# Patient Record
Sex: FEMALE | Employment: OTHER | URBAN - METROPOLITAN AREA
[De-identification: names, ages, dates, MRNs, and addresses within clinical notes are randomized per-mention and may not be internally consistent; named-entity substitution may affect disease eponyms.]

---

## 2022-11-16 ENCOUNTER — TELEPHONE (OUTPATIENT)
Dept: FAMILY MEDICINE CLINIC | Age: 72
End: 2022-11-16

## 2022-11-18 ENCOUNTER — OFFICE VISIT (OUTPATIENT)
Dept: FAMILY MEDICINE CLINIC | Age: 72
End: 2022-11-18
Payer: MEDICARE

## 2022-11-18 VITALS
HEIGHT: 59 IN | SYSTOLIC BLOOD PRESSURE: 136 MMHG | WEIGHT: 111.8 LBS | BODY MASS INDEX: 22.54 KG/M2 | OXYGEN SATURATION: 97 % | HEART RATE: 79 BPM | RESPIRATION RATE: 16 BRPM | DIASTOLIC BLOOD PRESSURE: 87 MMHG | TEMPERATURE: 97.8 F

## 2022-11-18 DIAGNOSIS — M25.552 BILATERAL HIP PAIN: Primary | ICD-10-CM

## 2022-11-18 DIAGNOSIS — M54.41 CHRONIC BILATERAL LOW BACK PAIN WITH RIGHT-SIDED SCIATICA: ICD-10-CM

## 2022-11-18 DIAGNOSIS — K64.9 HEMORRHOIDS, UNSPECIFIED HEMORRHOID TYPE: ICD-10-CM

## 2022-11-18 DIAGNOSIS — M79.18 BILATERAL BUTTOCK PAIN: ICD-10-CM

## 2022-11-18 DIAGNOSIS — M25.551 BILATERAL HIP PAIN: Primary | ICD-10-CM

## 2022-11-18 DIAGNOSIS — G89.29 CHRONIC BILATERAL LOW BACK PAIN WITH RIGHT-SIDED SCIATICA: ICD-10-CM

## 2022-11-18 DIAGNOSIS — C34.90 MALIGNANT NEOPLASM OF LUNG, UNSPECIFIED LATERALITY, UNSPECIFIED PART OF LUNG (HCC): ICD-10-CM

## 2022-11-18 DIAGNOSIS — R26.9 GAIT ABNORMALITY: ICD-10-CM

## 2022-11-18 PROCEDURE — G8510 SCR DEP NEG, NO PLAN REQD: HCPCS | Performed by: FAMILY MEDICINE

## 2022-11-18 PROCEDURE — G8420 CALC BMI NORM PARAMETERS: HCPCS | Performed by: FAMILY MEDICINE

## 2022-11-18 PROCEDURE — G8427 DOCREV CUR MEDS BY ELIG CLIN: HCPCS | Performed by: FAMILY MEDICINE

## 2022-11-18 PROCEDURE — G8536 NO DOC ELDER MAL SCRN: HCPCS | Performed by: FAMILY MEDICINE

## 2022-11-18 PROCEDURE — 99204 OFFICE O/P NEW MOD 45 MIN: CPT | Performed by: FAMILY MEDICINE

## 2022-11-18 PROCEDURE — G8400 PT W/DXA NO RESULTS DOC: HCPCS | Performed by: FAMILY MEDICINE

## 2022-11-18 PROCEDURE — 1123F ACP DISCUSS/DSCN MKR DOCD: CPT | Performed by: FAMILY MEDICINE

## 2022-11-18 PROCEDURE — G0463 HOSPITAL OUTPT CLINIC VISIT: HCPCS | Performed by: FAMILY MEDICINE

## 2022-11-18 PROCEDURE — 1101F PT FALLS ASSESS-DOCD LE1/YR: CPT | Performed by: FAMILY MEDICINE

## 2022-11-18 PROCEDURE — 3017F COLORECTAL CA SCREEN DOC REV: CPT | Performed by: FAMILY MEDICINE

## 2022-11-18 PROCEDURE — 1090F PRES/ABSN URINE INCON ASSESS: CPT | Performed by: FAMILY MEDICINE

## 2022-11-18 RX ORDER — OCTREOTIDE ACETATE 50 UG/ML
50 INJECTION, SOLUTION INTRAVENOUS; SUBCUTANEOUS
COMMUNITY

## 2022-11-18 RX ORDER — EVEROLIMUS 5 MG/1
5 TABLET ORAL 2 TIMES DAILY
COMMUNITY
Start: 2022-10-31

## 2022-11-18 RX ORDER — TIZANIDINE 4 MG/1
4 TABLET ORAL
Qty: 30 TABLET | Refills: 2 | Status: SHIPPED | OUTPATIENT
Start: 2022-11-18

## 2022-11-18 RX ORDER — GABAPENTIN 300 MG/1
300 CAPSULE ORAL
Qty: 30 CAPSULE | Refills: 2 | Status: SHIPPED | OUTPATIENT
Start: 2022-11-18

## 2022-11-18 RX ORDER — PHOSPHORATED CARBOHYDRATE 1.87; 21.5; 1.87 G/5ML; MG/5ML; G/5ML
5 SOLUTION ORAL AS NEEDED
COMMUNITY

## 2022-11-18 RX ORDER — LORATADINE 10 MG/1
TABLET ORAL AS NEEDED
COMMUNITY
Start: 2022-06-03

## 2022-11-18 RX ORDER — FLUTICASONE PROPIONATE 50 MCG
SPRAY, SUSPENSION (ML) NASAL AS NEEDED
COMMUNITY
Start: 2022-06-03

## 2022-11-18 RX ORDER — DESVENLAFAXINE SUCCINATE 50 MG/1
TABLET, EXTENDED RELEASE ORAL
COMMUNITY
Start: 2022-09-16 | End: 2022-11-18

## 2022-11-18 RX ORDER — EVEROLIMUS 10 MG/1
10 TABLET ORAL DAILY
COMMUNITY
Start: 2022-06-03 | End: 2022-11-18

## 2022-11-18 NOTE — PROGRESS NOTES
LANIE Ward  is a 81 y.o. female who presents as a new patient. Some record is available in Care Everywhere. Reviewed prior to visit. She appears to have a history of stage IV lung cancer followed by [facility] oncology detailed below. Moved from [place] to [place] not too long ago. Speaks [place]. Initial portion of the visit conducted using  (green tablet) but patient and daughter expressed a preference to have daughter translate and so we transition to that. Complains of low back/buttock pain that goes down her legs. Right is worse than left. Brought this up with her oncologist in September and had a trial off of her cancer medication to see if that was a side effect. It was not a side effect. She feels that has gotten progressively worse since then. It is most problematic at night. She points to the sacral area bilaterally when describing the pain. Points to the middle of the buttock and then motions down the back of her leg behind the knee down to the calf. Stops at the ankle and does not involve the feet. Always present. Worse with standing to the point where she cannot stand at counter and prepare a meal.  Difficult to walk. Is painful getting into the bathroom. Painful to sleep particularly with rolling over in bed. Record reviewed in Care Everywhere: Follows with [facility] oncology for neuroendocrine carcinoid tumor of bilateral lungs diagnosed 2018 most recently seen September 2022. Report at this most recent visit suggest stability of her lung nodules. Appears to have a percutaneous cholecystectomy tube after found to have cholecystitis in May 2022. Rather than a surgical intervention had IR drainage and this tube is been in place since June 1, 2022. Seems that the tube gets capped and if she has a recurrence of her right upper quadrant pain and then a bag will get attached to it.   Has to do this about every 10 days    Complaint of cough and shortness of breath with physical activity that is not compatible with the amount of lung involvement (CT did mention \"mild diffuse air trapping with interval improvement from prior\") also notes mild emphysema    Atherosclerotic CAD noted on CT scan so was suggested she proceed with a stress test to work-up the shortness of breath. Stress test appears to be normal      Labs last done 2022  GFR 96  Creatinine 0.6  Glucose 128  WBC 4.8  Hemoglobin 12.3  Platelet 965    Stress test results 2022 \"no quantifiable perfusion defect\" LV ejection fraction greater than 90%      PMHx:  Past Medical History:   Diagnosis Date    [facility]        Meds:       Allergies: Allergies   Allergen Reactions    Other Plant, Animal, Environmental Anxiety, Cough, Other (comments), Palpitations and Shortness of Breath    Mold Anxiety, Cough and Shortness of Breath       Smoker:  Social History     Tobacco Use   Smoking Status Former    Types: Cigarettes    Quit date:     Years since quittin.9   Smokeless Tobacco Never       ETOH:   Social History     Substance and Sexual Activity   Alcohol Use Not Currently       FH: History reviewed. No pertinent family history. ROS:   As listed in HPI. In addition:  Constitutional:   No headache, fever, fatigue, weight loss or weight gain      Cardiac:    No chest pain      Resp:   No cough or shortness of breath      Neuro   No loss of consciousness, dizziness, seizures      Physical Exam:  Blood pressure 136/87, pulse 79, temperature 97.8 °F (36.6 °C), temperature source Temporal, resp. rate 16, height 4' 11\" (1.499 m), weight 111 lb 12.8 oz (50.7 kg), SpO2 97 %. GEN: No apparent distress. Alert and oriented and responds to all questions appropriately. NEUROLOGIC:  No focal neurologic deficits. Strength and sensation grossly intact. Coordination and gait grossly intact. EXT: Well perfused. No edema. SKIN: No obvious rashes. Low back examined.   There is no midline bony tenderness. There is no paraspinal, erector spinate quadratus lumborum tenderness. There are tender points in the gluteus linda at its insertion at the SI. There is pain to the piriformis on palpation. Figure 4 on the right reveals a limited internal rotation to about 50 degrees and pain on stretch of the gluteus and piriformis. Left leg is similar although range of motion is closer to 80 degrees  internal rotation         Assessment and Plan     CAD  Plaques noted on imaging. Recent stress test with no perfusion defect    Emphysema  Evident on CT    Stage IV lung cancer  neuroendocrine carcinoid tumor of bilateral lungs diagnosed 2018   Care of [facility] oncology    Complains of hemorrhoidal bleeding. Apparently this has been significant enough that she has bled through her clothing and needed to change while out of the house  Would like to consult with a specialist.  Refer to colon and rectal.    Low back pain, bilateral buttock pain, radiculopathy down both legs but right worse than the left  Worsening over the last few months in September  My impression is this is primarily muscular. Possible SI joint, possible hip joint. Given the history of cancer imaging is prudent  Has had recent images of her spine suggesting no osseous lesions which is reassuring  I would like to check bilateral hip x-rays  Physical therapy would be appropriate. However she cannot leave the home without the assistance of another. Requires home health physical therapy due to being homebound. Try tizanidine and gabapentin for the radicular pains.   Sounds like she only needs medicine at night so we will start with that    Mobility problem  She cannot comfortably move from one room to another within the home due to a combination of shortness of breath and the back/leg pain mentioned above  Cane and walker are not sufficient to help move from 1 room to the other  Manual wheelchair is not sufficient because she cannot self propel due to SOB/ODELL  Requires a powered mobility device to move within the home and perform ADLs    Is already been in contact with ning Fink  fax [phone]    This was a 45-minute visit. Greater than 50% of the time was spent counseling the patient on back pain/hip pain. ICD-10-CM ICD-9-CM    1. Bilateral hip pain  M25.551 719.45 XR HIP RT W OR WO PELV 2-3 VWS    M25.552  XR HIP LT W OR WO PELV 2-3 VWS      OTHER      REFERRAL TO HOME HEALTH      2. Bilateral buttock pain  M79.18 729.1 XR HIP RT W OR WO PELV 2-3 VWS      XR HIP LT W OR WO PELV 2-3 VWS      OTHER      REFERRAL TO HOME HEALTH      3. Chronic bilateral low back pain with right-sided sciatica  M54.41 724.2 OTHER    G89.29 724.3 REFERRAL TO HOME HEALTH     338.29 tiZANidine (ZANAFLEX) 4 mg tablet      gabapentin (NEURONTIN) 300 mg capsule      4. Hemorrhoids, unspecified hemorrhoid type  K64.9 455.6 REFERRAL TO COLON AND RECTAL SURGERY      5. Gait abnormality  R26.9 781.2 OTHER      REFERRAL TO HOME HEALTH      6. Malignant neoplasm of lung, unspecified laterality, unspecified part of lung ([facility]  C34.90 162.9           AVS given.  Pt expressed understanding of instructions

## 2022-11-18 NOTE — PROGRESS NOTES
Health Maintenance Due   Topic Date Due    Hepatitis C Screening  Never done    Depression Screen  Never done    DTaP/Tdap/Td series (1 - Tdap) Never done    Lipid Screen  Never done    Colorectal Cancer Screening Combo  Never done    Shingrix Vaccine Age 50> (1 of 2) Never done    Breast Cancer Screen Mammogram  Never done    Bone Densitometry (Dexa) Screening  Never done    Pneumococcal 65+ years (3) 06/24/2021    Flu Vaccine (1) 08/01/2022     1. \"Have you been to the ER, urgent care clinic since your last visit? Hospitalized since your last visit? \" No    2. \"Have you seen or consulted any other health care providers outside of the [address] [place] [address] since your last visit? \" No     3. For patients aged 39-70: Has the patient had a colonoscopy / FIT/ Cologuard? Yes - Care Gap present. Rooming MA/LPN to request most recent results yes. Dr. Clotilde Apley Pietro Hayward, MD) [phone]      If the patient is female:    4. For patients aged 41-77: Has the patient had a mammogram within the past 2 years? Yes - Care Gap present. Most recent result on file  yes. [facility] [phone] esther wallis md      5. For patients aged 21-65: Has the patient had a pap smear? NA - based on age or sex    Do you have an [address] in place in the event that you have a healthcare crisis that could impact your decision making as it pertains to your health? YES    Would you like information about Advance Care Planning? NO    Information given.  NO

## 2022-11-21 ENCOUNTER — HOSPITAL ENCOUNTER (OUTPATIENT)
Dept: GENERAL RADIOLOGY | Age: 72
Discharge: HOME | End: 2022-11-21
Payer: MEDICARE

## 2022-11-21 DIAGNOSIS — M25.551 BILATERAL HIP PAIN: ICD-10-CM

## 2022-11-21 DIAGNOSIS — M25.552 BILATERAL HIP PAIN: ICD-10-CM

## 2022-11-21 DIAGNOSIS — M79.18 BILATERAL BUTTOCK PAIN: ICD-10-CM

## 2022-11-21 PROCEDURE — 73502 X-RAY EXAM HIP UNI 2-3 VIEWS: CPT

## 2022-11-22 ENCOUNTER — TELEPHONE (OUTPATIENT)
Dept: FAMILY MEDICINE CLINIC | Age: 72
End: 2022-11-22

## 2022-11-22 ENCOUNTER — DOCUMENTATION ONLY (OUTPATIENT)
Dept: FAMILY MEDICINE CLINIC | Age: 72
End: 2022-11-22

## 2022-11-22 NOTE — PROGRESS NOTES
** Late Entry 11.21.2022 **    DME order, demographics, and office note faxed to [facility]inder with attn to [facility] VICTOR HUGO FORRESTER @ [phone] and confirmation page recv'd. Referral, demographics, and office note faxed to [address] and confirmation page recv'd.

## 2022-11-22 NOTE — TELEPHONE ENCOUNTER
Hip x-rays reviewed.   The x-ray does show mild arthritis in the hips but her hips are not causing her any pain based on our exam.  Should expect improvement with physical therapy as we discussed    This is a reassuring x-ray

## 2023-01-03 ENCOUNTER — DOCUMENTATION ONLY (OUTPATIENT)
Dept: FAMILY MEDICINE CLINIC | Age: 73
End: 2023-01-03

## 2023-06-06 ENCOUNTER — HOSPITAL ENCOUNTER (OUTPATIENT)
Facility: HOSPITAL | Age: 73
Discharge: HOME | End: 2023-06-09
Attending: ORTHOPAEDIC SURGERY
Payer: MEDICARE

## 2023-06-06 DIAGNOSIS — M47.816 LUMBAR SPONDYLOSIS: ICD-10-CM

## 2023-06-06 DIAGNOSIS — M54.16 RADICULOPATHY OF LUMBAR REGION: ICD-10-CM

## 2023-06-06 DIAGNOSIS — M54.31 RIGHT SIDED SCIATICA: ICD-10-CM

## 2023-06-06 DIAGNOSIS — M51.36 DDD (DEGENERATIVE DISC DISEASE), LUMBAR: ICD-10-CM

## 2023-06-06 DIAGNOSIS — M54.50 LUMBAR PAIN: ICD-10-CM

## 2023-06-06 PROCEDURE — 72148 MRI LUMBAR SPINE W/O DYE: CPT

## 2023-11-06 ENCOUNTER — HOSPITAL ENCOUNTER (OUTPATIENT)
Facility: HOSPITAL | Age: 73
Discharge: HOME | End: 2023-11-09
Payer: MEDICARE

## 2023-11-06 VITALS — BODY MASS INDEX: 22.53 KG/M2 | HEIGHT: 59 IN | WEIGHT: 111.77 LBS

## 2023-11-06 DIAGNOSIS — Z12.31 VISIT FOR SCREENING MAMMOGRAM: ICD-10-CM

## 2023-11-06 PROCEDURE — 77063 BREAST TOMOSYNTHESIS BI: CPT

## 2024-02-25 SDOH — ECONOMIC STABILITY: FOOD INSECURITY: WITHIN THE PAST 12 MONTHS, THE FOOD YOU BOUGHT JUST DIDN'T LAST AND YOU DIDN'T HAVE MONEY TO GET MORE.: NEVER TRUE

## 2024-02-25 SDOH — HEALTH STABILITY: PHYSICAL HEALTH: ON AVERAGE, HOW MANY DAYS PER WEEK DO YOU ENGAGE IN MODERATE TO STRENUOUS EXERCISE (LIKE A BRISK WALK)?: 0 DAYS

## 2024-02-25 SDOH — HEALTH STABILITY: PHYSICAL HEALTH: ON AVERAGE, HOW MANY MINUTES DO YOU ENGAGE IN EXERCISE AT THIS LEVEL?: 0 MIN

## 2024-02-25 SDOH — ECONOMIC STABILITY: FOOD INSECURITY: WITHIN THE PAST 12 MONTHS, YOU WORRIED THAT YOUR FOOD WOULD RUN OUT BEFORE YOU GOT MONEY TO BUY MORE.: NEVER TRUE

## 2024-02-25 SDOH — ECONOMIC STABILITY: INCOME INSECURITY: HOW HARD IS IT FOR YOU TO PAY FOR THE VERY BASICS LIKE FOOD, HOUSING, MEDICAL CARE, AND HEATING?: SOMEWHAT HARD

## 2024-02-25 SDOH — ECONOMIC STABILITY: HOUSING INSECURITY
IN THE LAST 12 MONTHS, WAS THERE A TIME WHEN YOU DID NOT HAVE A STEADY PLACE TO SLEEP OR SLEPT IN A SHELTER (INCLUDING NOW)?: NO

## 2024-02-25 SDOH — ECONOMIC STABILITY: TRANSPORTATION INSECURITY
IN THE PAST 12 MONTHS, HAS LACK OF TRANSPORTATION KEPT YOU FROM MEETINGS, WORK, OR FROM GETTING THINGS NEEDED FOR DAILY LIVING?: NO

## 2024-02-25 ASSESSMENT — LIFESTYLE VARIABLES
HOW OFTEN DO YOU HAVE SIX OR MORE DRINKS ON ONE OCCASION: 1
HOW OFTEN DO YOU HAVE A DRINK CONTAINING ALCOHOL: 1
HOW MANY STANDARD DRINKS CONTAINING ALCOHOL DO YOU HAVE ON A TYPICAL DAY: PATIENT DOES NOT DRINK
HOW OFTEN DO YOU HAVE A DRINK CONTAINING ALCOHOL: NEVER
HOW MANY STANDARD DRINKS CONTAINING ALCOHOL DO YOU HAVE ON A TYPICAL DAY: 0

## 2024-02-25 ASSESSMENT — PATIENT HEALTH QUESTIONNAIRE - PHQ9
4. FEELING TIRED OR HAVING LITTLE ENERGY: 2
7. TROUBLE CONCENTRATING ON THINGS, SUCH AS READING THE NEWSPAPER OR WATCHING TELEVISION: 1
2. FEELING DOWN, DEPRESSED OR HOPELESS: 1
8. MOVING OR SPEAKING SO SLOWLY THAT OTHER PEOPLE COULD HAVE NOTICED. OR THE OPPOSITE, BEING SO FIGETY OR RESTLESS THAT YOU HAVE BEEN MOVING AROUND A LOT MORE THAN USUAL: 0
3. TROUBLE FALLING OR STAYING ASLEEP: 3
6. FEELING BAD ABOUT YOURSELF - OR THAT YOU ARE A FAILURE OR HAVE LET YOURSELF OR YOUR FAMILY DOWN: 0
10. IF YOU CHECKED OFF ANY PROBLEMS, HOW DIFFICULT HAVE THESE PROBLEMS MADE IT FOR YOU TO DO YOUR WORK, TAKE CARE OF THINGS AT HOME, OR GET ALONG WITH OTHER PEOPLE: 1
9. THOUGHTS THAT YOU WOULD BE BETTER OFF DEAD, OR OF HURTING YOURSELF: 0
5. POOR APPETITE OR OVEREATING: 0
SUM OF ALL RESPONSES TO PHQ QUESTIONS 1-9: 9
SUM OF ALL RESPONSES TO PHQ QUESTIONS 1-9: 9
SUM OF ALL RESPONSES TO PHQ9 QUESTIONS 1 & 2: 3
SUM OF ALL RESPONSES TO PHQ QUESTIONS 1-9: 9
SUM OF ALL RESPONSES TO PHQ QUESTIONS 1-9: 9
1. LITTLE INTEREST OR PLEASURE IN DOING THINGS: 2

## 2024-02-26 ENCOUNTER — OFFICE VISIT (OUTPATIENT)
Age: 74
End: 2024-02-26
Payer: MEDICARE

## 2024-02-26 VITALS
TEMPERATURE: 97.8 F | RESPIRATION RATE: 16 BRPM | SYSTOLIC BLOOD PRESSURE: 155 MMHG | HEIGHT: 59 IN | BODY MASS INDEX: 24.96 KG/M2 | DIASTOLIC BLOOD PRESSURE: 75 MMHG | HEART RATE: 87 BPM | OXYGEN SATURATION: 96 % | WEIGHT: 123.8 LBS

## 2024-02-26 DIAGNOSIS — Z00.00 ROUTINE GENERAL MEDICAL EXAMINATION AT A HEALTH CARE FACILITY: Primary | ICD-10-CM

## 2024-02-26 DIAGNOSIS — M54.41 CHRONIC RIGHT-SIDED LOW BACK PAIN WITH RIGHT-SIDED SCIATICA: ICD-10-CM

## 2024-02-26 DIAGNOSIS — M77.01 GOLFER'S ELBOW, RIGHT: ICD-10-CM

## 2024-02-26 DIAGNOSIS — K64.9 HEMORRHOIDS, UNSPECIFIED HEMORRHOID TYPE: ICD-10-CM

## 2024-02-26 DIAGNOSIS — Z23 ENCOUNTER FOR IMMUNIZATION: ICD-10-CM

## 2024-02-26 DIAGNOSIS — G89.29 CHRONIC RIGHT-SIDED LOW BACK PAIN WITH RIGHT-SIDED SCIATICA: ICD-10-CM

## 2024-02-26 DIAGNOSIS — M79.18 MYALGIA, OTHER SITE: ICD-10-CM

## 2024-02-26 DIAGNOSIS — M67.911 DISORDER OF ROTATOR CUFF, RIGHT: ICD-10-CM

## 2024-02-26 DIAGNOSIS — M75.21 BICEPS TENDINITIS OF RIGHT SHOULDER: ICD-10-CM

## 2024-02-26 DIAGNOSIS — G89.29 OTHER CHRONIC PAIN: ICD-10-CM

## 2024-02-26 DIAGNOSIS — R26.89 BALANCE PROBLEM: ICD-10-CM

## 2024-02-26 DIAGNOSIS — B35.1 ONYCHOMYCOSIS: ICD-10-CM

## 2024-02-26 PROCEDURE — 1123F ACP DISCUSS/DSCN MKR DOCD: CPT | Performed by: FAMILY MEDICINE

## 2024-02-26 PROCEDURE — 90694 VACC AIIV4 NO PRSRV 0.5ML IM: CPT | Performed by: FAMILY MEDICINE

## 2024-02-26 PROCEDURE — 99214 OFFICE O/P EST MOD 30 MIN: CPT | Performed by: FAMILY MEDICINE

## 2024-02-26 PROCEDURE — G8484 FLU IMMUNIZE NO ADMIN: HCPCS | Performed by: FAMILY MEDICINE

## 2024-02-26 PROCEDURE — 1090F PRES/ABSN URINE INCON ASSESS: CPT | Performed by: FAMILY MEDICINE

## 2024-02-26 PROCEDURE — G8419 CALC BMI OUT NRM PARAM NOF/U: HCPCS | Performed by: FAMILY MEDICINE

## 2024-02-26 PROCEDURE — G8399 PT W/DXA RESULTS DOCUMENT: HCPCS | Performed by: FAMILY MEDICINE

## 2024-02-26 PROCEDURE — 3017F COLORECTAL CA SCREEN DOC REV: CPT | Performed by: FAMILY MEDICINE

## 2024-02-26 PROCEDURE — PBSHW INFLUENZA, FLUAD, (AGE 65 Y+), IM, PF, 0.5 ML: Performed by: FAMILY MEDICINE

## 2024-02-26 PROCEDURE — 1036F TOBACCO NON-USER: CPT | Performed by: FAMILY MEDICINE

## 2024-02-26 PROCEDURE — G8427 DOCREV CUR MEDS BY ELIG CLIN: HCPCS | Performed by: FAMILY MEDICINE

## 2024-02-26 PROCEDURE — G0439 PPPS, SUBSEQ VISIT: HCPCS | Performed by: FAMILY MEDICINE

## 2024-02-26 RX ORDER — TIZANIDINE 4 MG/1
4 TABLET ORAL NIGHTLY PRN
Qty: 30 TABLET | Refills: 3 | Status: SHIPPED | OUTPATIENT
Start: 2024-02-26

## 2024-02-26 RX ORDER — DULOXETIN HYDROCHLORIDE 20 MG/1
20 CAPSULE, DELAYED RELEASE ORAL DAILY
Qty: 30 CAPSULE | Refills: 3 | Status: SHIPPED | OUTPATIENT
Start: 2024-02-26

## 2024-02-26 SDOH — ECONOMIC STABILITY: INCOME INSECURITY: HOW HARD IS IT FOR YOU TO PAY FOR THE VERY BASICS LIKE FOOD, HOUSING, MEDICAL CARE, AND HEATING?: NOT HARD AT ALL

## 2024-02-26 SDOH — ECONOMIC STABILITY: FOOD INSECURITY: WITHIN THE PAST 12 MONTHS, YOU WORRIED THAT YOUR FOOD WOULD RUN OUT BEFORE YOU GOT MONEY TO BUY MORE.: NEVER TRUE

## 2024-02-26 SDOH — ECONOMIC STABILITY: FOOD INSECURITY: WITHIN THE PAST 12 MONTHS, THE FOOD YOU BOUGHT JUST DIDN'T LAST AND YOU DIDN'T HAVE MONEY TO GET MORE.: NEVER TRUE

## 2024-02-26 NOTE — PROGRESS NOTES
Chief Complaint   Patient presents with    Medicare AWV         Health Maintenance Due   Topic Date Due    COVID-19 Vaccine (1) Never done    Hepatitis C screen  Never done    DTaP/Tdap/Td vaccine (1 - Tdap) Never done    Lipids  Never done    Colorectal Cancer Screen  Never done    Respiratory Syncytial Virus (RSV) Pregnant or age 60 yrs+ (1 - 1-dose 60+ series) Never done    Shingles vaccine (2 of 3) 08/18/2016    Pneumococcal 65+ years Vaccine (3 - PPSV23 or PCV20) 06/26/2022    Flu vaccine (1) 08/01/2023    Annual Wellness Visit (Medicare)  Never done         \"Have you been to the ER, urgent care clinic since your last visit?  Hospitalized since your last visit?\"    NO    “Have you seen or consulted any other health care providers outside of [facility] since your last visit?”    oncology    “Have you had a colorectal cancer screening such as a colonoscopy/FIT/Cologuard?    NO

## 2024-02-26 NOTE — PROGRESS NOTES
Medicare Annual Wellness Visit    I have reviewed the patient's medical history in detail and updated the computerized patient record.     History   Boby Malone is a 74 y.o. female who presents to follow-up on chronic medical issues    Establish care with me in 2022.  This is my second time meeting her.  She speaks [place] and has expressed that she does not like the  and prefers to use her daughter.    Again complains of low back pain goes down her right leg.  I tried her on tizanidine which she liked.  Tried some home health physical therapy which was helpful but she did not do her homework.  Established with orthospine and has an injection x 2 that did not last more than a day.  Has been given gabapentin and takes 600 3 times daily without sufficient relief (she is not sure she is getting any relief) and tried amitriptyline for 2 weeks but got intolerably dizzy.    She has been offered surgery by Ortho.  She has other comorbidities that would make her high risk for surgery but her cancer doctor has said that she could stop Xarelto and that her lungs could handle procedure (patient's report)    She has been using a cane because she feels that her back pain and right leg weakness are affecting her balance.  She is using a cane in her right hand.  In the last week or so she has developed pain in the shoulder and in the hand      History:  Follows with [facility] oncology for neuroendocrine carcinoid tumor of bilateral lungs diagnosed 2018.  Following regularly.  Evidently stable     Appears to have a percutaneous cholecystectomy tube after found to have cholecystitis in May 2022.  Rather than a surgical intervention had IR drainage and this tube is been in place since June 1, 2022.  As of our visit 11/2022 seems that the tube gets capped and if she has a recurrence of her right upper quadrant pain and then a bag will get attached to it.  Has to do this about every 10 days.  Did not follow-up on

## 2024-03-04 ENCOUNTER — TELEPHONE (OUTPATIENT)
Age: 74
End: 2024-03-04

## 2024-03-04 NOTE — TELEPHONE ENCOUNTER
No strong preference but I think what she is asking is where did we send the referral.  I had ordered home health physical therapy at her visit.  I take it that she has not successfully gotten that set up yet.  Please check with daughter and resend referral as necessary

## 2024-04-04 NOTE — PROGRESS NOTES
Called [facility] x2 for patient using .     Call made to Dr. Crockett's office for orders and Xarelto instructions. Making them aware unable to get in contact with patient. Patient was told by office to hold Xarelto 3 days and TOA/NPO instructions were reviewed. TOA was 0530 4/5/24 to surgery center.

## 2024-04-05 ENCOUNTER — ANESTHESIA EVENT (OUTPATIENT)
Facility: HOSPITAL | Age: 74
End: 2024-04-05
Payer: MEDICARE

## 2024-04-05 ENCOUNTER — ANESTHESIA (OUTPATIENT)
Facility: HOSPITAL | Age: 74
End: 2024-04-05
Payer: MEDICARE

## 2024-04-05 ENCOUNTER — HOSPITAL ENCOUNTER (OUTPATIENT)
Facility: HOSPITAL | Age: 74
Setting detail: OUTPATIENT SURGERY
Discharge: HOME | End: 2024-04-05
Attending: STUDENT IN AN ORGANIZED HEALTH CARE EDUCATION/TRAINING PROGRAM | Admitting: STUDENT IN AN ORGANIZED HEALTH CARE EDUCATION/TRAINING PROGRAM
Payer: MEDICARE

## 2024-04-05 VITALS
OXYGEN SATURATION: 94 % | HEIGHT: 59 IN | WEIGHT: 124.78 LBS | RESPIRATION RATE: 14 BRPM | TEMPERATURE: 97.4 F | BODY MASS INDEX: 25.16 KG/M2 | SYSTOLIC BLOOD PRESSURE: 170 MMHG | HEART RATE: 80 BPM | DIASTOLIC BLOOD PRESSURE: 76 MMHG

## 2024-04-05 DIAGNOSIS — K64.2 GRADE III HEMORRHOIDS: Primary | ICD-10-CM

## 2024-04-05 PROCEDURE — 3600000002 HC SURGERY LEVEL 2 BASE: Performed by: STUDENT IN AN ORGANIZED HEALTH CARE EDUCATION/TRAINING PROGRAM

## 2024-04-05 PROCEDURE — 2500000003 HC RX 250 WO HCPCS

## 2024-04-05 PROCEDURE — 7100000001 HC PACU RECOVERY - ADDTL 15 MIN: Performed by: STUDENT IN AN ORGANIZED HEALTH CARE EDUCATION/TRAINING PROGRAM

## 2024-04-05 PROCEDURE — 2580000003 HC RX 258: Performed by: STUDENT IN AN ORGANIZED HEALTH CARE EDUCATION/TRAINING PROGRAM

## 2024-04-05 PROCEDURE — 7100000000 HC PACU RECOVERY - FIRST 15 MIN: Performed by: STUDENT IN AN ORGANIZED HEALTH CARE EDUCATION/TRAINING PROGRAM

## 2024-04-05 PROCEDURE — 6360000002 HC RX W HCPCS

## 2024-04-05 PROCEDURE — 7100000010 HC PHASE II RECOVERY - FIRST 15 MIN: Performed by: STUDENT IN AN ORGANIZED HEALTH CARE EDUCATION/TRAINING PROGRAM

## 2024-04-05 PROCEDURE — 2720000010 HC SURG SUPPLY STERILE: Performed by: STUDENT IN AN ORGANIZED HEALTH CARE EDUCATION/TRAINING PROGRAM

## 2024-04-05 PROCEDURE — 3700000000 HC ANESTHESIA ATTENDED CARE: Performed by: STUDENT IN AN ORGANIZED HEALTH CARE EDUCATION/TRAINING PROGRAM

## 2024-04-05 PROCEDURE — 6360000002 HC RX W HCPCS: Performed by: STUDENT IN AN ORGANIZED HEALTH CARE EDUCATION/TRAINING PROGRAM

## 2024-04-05 PROCEDURE — 7100000011 HC PHASE II RECOVERY - ADDTL 15 MIN: Performed by: STUDENT IN AN ORGANIZED HEALTH CARE EDUCATION/TRAINING PROGRAM

## 2024-04-05 PROCEDURE — 3600000012 HC SURGERY LEVEL 2 ADDTL 15MIN: Performed by: STUDENT IN AN ORGANIZED HEALTH CARE EDUCATION/TRAINING PROGRAM

## 2024-04-05 PROCEDURE — 3700000001 HC ADD 15 MINUTES (ANESTHESIA): Performed by: STUDENT IN AN ORGANIZED HEALTH CARE EDUCATION/TRAINING PROGRAM

## 2024-04-05 PROCEDURE — C9290 INJ, BUPIVACAINE LIPOSOME: HCPCS | Performed by: STUDENT IN AN ORGANIZED HEALTH CARE EDUCATION/TRAINING PROGRAM

## 2024-04-05 PROCEDURE — 2709999900 HC NON-CHARGEABLE SUPPLY: Performed by: STUDENT IN AN ORGANIZED HEALTH CARE EDUCATION/TRAINING PROGRAM

## 2024-04-05 PROCEDURE — 88304 TISSUE EXAM BY PATHOLOGIST: CPT

## 2024-04-05 RX ORDER — OXYCODONE HYDROCHLORIDE 5 MG/1
5 TABLET ORAL
Status: DISCONTINUED | OUTPATIENT
Start: 2024-04-05 | End: 2024-04-05 | Stop reason: HOSPADM

## 2024-04-05 RX ORDER — OXYCODONE HYDROCHLORIDE 5 MG/1
5 TABLET ORAL EVERY 6 HOURS PRN
Qty: 30 TABLET | Refills: 0 | Status: SHIPPED | OUTPATIENT
Start: 2024-04-05 | End: 2024-04-12

## 2024-04-05 RX ORDER — LIDOCAINE HYDROCHLORIDE 20 MG/ML
INJECTION, SOLUTION EPIDURAL; INFILTRATION; INTRACAUDAL; PERINEURAL PRN
Status: DISCONTINUED | OUTPATIENT
Start: 2024-04-05 | End: 2024-04-05 | Stop reason: SDUPTHER

## 2024-04-05 RX ORDER — SODIUM CHLORIDE, SODIUM LACTATE, POTASSIUM CHLORIDE, CALCIUM CHLORIDE 600; 310; 30; 20 MG/100ML; MG/100ML; MG/100ML; MG/100ML
INJECTION, SOLUTION INTRAVENOUS CONTINUOUS
Status: DISCONTINUED | OUTPATIENT
Start: 2024-04-05 | End: 2024-04-05 | Stop reason: HOSPADM

## 2024-04-05 RX ORDER — NALOXONE HYDROCHLORIDE 0.4 MG/ML
INJECTION, SOLUTION INTRAMUSCULAR; INTRAVENOUS; SUBCUTANEOUS PRN
Status: DISCONTINUED | OUTPATIENT
Start: 2024-04-05 | End: 2024-04-05 | Stop reason: HOSPADM

## 2024-04-05 RX ORDER — SODIUM CHLORIDE 0.9 % (FLUSH) 0.9 %
5-40 SYRINGE (ML) INJECTION EVERY 12 HOURS SCHEDULED
Status: DISCONTINUED | OUTPATIENT
Start: 2024-04-05 | End: 2024-04-05 | Stop reason: HOSPADM

## 2024-04-05 RX ORDER — MIDAZOLAM HYDROCHLORIDE 1 MG/ML
INJECTION INTRAMUSCULAR; INTRAVENOUS PRN
Status: DISCONTINUED | OUTPATIENT
Start: 2024-04-05 | End: 2024-04-05 | Stop reason: SDUPTHER

## 2024-04-05 RX ORDER — PROPOFOL 10 MG/ML
INJECTION, EMULSION INTRAVENOUS PRN
Status: DISCONTINUED | OUTPATIENT
Start: 2024-04-05 | End: 2024-04-05 | Stop reason: SDUPTHER

## 2024-04-05 RX ORDER — ONDANSETRON 2 MG/ML
4 INJECTION INTRAMUSCULAR; INTRAVENOUS
Status: DISCONTINUED | OUTPATIENT
Start: 2024-04-05 | End: 2024-04-05 | Stop reason: HOSPADM

## 2024-04-05 RX ORDER — FENTANYL CITRATE 50 UG/ML
INJECTION, SOLUTION INTRAMUSCULAR; INTRAVENOUS PRN
Status: DISCONTINUED | OUTPATIENT
Start: 2024-04-05 | End: 2024-04-05 | Stop reason: SDUPTHER

## 2024-04-05 RX ORDER — HYDROMORPHONE HYDROCHLORIDE 1 MG/ML
0.5 INJECTION, SOLUTION INTRAMUSCULAR; INTRAVENOUS; SUBCUTANEOUS EVERY 5 MIN PRN
Status: DISCONTINUED | OUTPATIENT
Start: 2024-04-05 | End: 2024-04-05 | Stop reason: HOSPADM

## 2024-04-05 RX ORDER — DEXAMETHASONE SODIUM PHOSPHATE 4 MG/ML
INJECTION, SOLUTION INTRA-ARTICULAR; INTRALESIONAL; INTRAMUSCULAR; INTRAVENOUS; SOFT TISSUE PRN
Status: DISCONTINUED | OUTPATIENT
Start: 2024-04-05 | End: 2024-04-05 | Stop reason: SDUPTHER

## 2024-04-05 RX ORDER — FENTANYL CITRATE 50 UG/ML
25 INJECTION, SOLUTION INTRAMUSCULAR; INTRAVENOUS EVERY 5 MIN PRN
Status: DISCONTINUED | OUTPATIENT
Start: 2024-04-05 | End: 2024-04-05 | Stop reason: HOSPADM

## 2024-04-05 RX ORDER — ROCURONIUM BROMIDE 10 MG/ML
INJECTION, SOLUTION INTRAVENOUS PRN
Status: DISCONTINUED | OUTPATIENT
Start: 2024-04-05 | End: 2024-04-05 | Stop reason: SDUPTHER

## 2024-04-05 RX ORDER — BUPIVACAINE HYDROCHLORIDE 2.5 MG/ML
INJECTION, SOLUTION EPIDURAL; INFILTRATION; INTRACAUDAL PRN
Status: DISCONTINUED | OUTPATIENT
Start: 2024-04-05 | End: 2024-04-05 | Stop reason: ALTCHOICE

## 2024-04-05 RX ORDER — ONDANSETRON 2 MG/ML
INJECTION INTRAMUSCULAR; INTRAVENOUS PRN
Status: DISCONTINUED | OUTPATIENT
Start: 2024-04-05 | End: 2024-04-05 | Stop reason: SDUPTHER

## 2024-04-05 RX ORDER — SODIUM CHLORIDE 0.9 % (FLUSH) 0.9 %
5-40 SYRINGE (ML) INJECTION PRN
Status: DISCONTINUED | OUTPATIENT
Start: 2024-04-05 | End: 2024-04-05 | Stop reason: HOSPADM

## 2024-04-05 RX ADMIN — FENTANYL CITRATE 50 MCG: 50 INJECTION, SOLUTION INTRAMUSCULAR; INTRAVENOUS at 07:55

## 2024-04-05 RX ADMIN — ROCURONIUM BROMIDE 30 MG: 10 INJECTION INTRAVENOUS at 07:41

## 2024-04-05 RX ADMIN — MIDAZOLAM HYDROCHLORIDE 1 MG: 1 INJECTION, SOLUTION INTRAMUSCULAR; INTRAVENOUS at 07:33

## 2024-04-05 RX ADMIN — LIDOCAINE HYDROCHLORIDE 80 MG: 20 INJECTION, SOLUTION EPIDURAL; INFILTRATION; INTRACAUDAL; PERINEURAL at 07:40

## 2024-04-05 RX ADMIN — ONDANSETRON 4 MG: 2 INJECTION INTRAMUSCULAR; INTRAVENOUS at 08:05

## 2024-04-05 RX ADMIN — PROPOFOL 170 MG: 10 INJECTION, EMULSION INTRAVENOUS at 07:40

## 2024-04-05 RX ADMIN — SUGAMMADEX 200 MG: 100 INJECTION, SOLUTION INTRAVENOUS at 08:33

## 2024-04-05 RX ADMIN — DEXAMETHASONE SODIUM PHOSPHATE 8 MG: 4 INJECTION, SOLUTION INTRAMUSCULAR; INTRAVENOUS at 07:47

## 2024-04-05 RX ADMIN — FENTANYL CITRATE 50 MCG: 50 INJECTION, SOLUTION INTRAMUSCULAR; INTRAVENOUS at 07:40

## 2024-04-05 RX ADMIN — SODIUM CHLORIDE, POTASSIUM CHLORIDE, SODIUM LACTATE AND CALCIUM CHLORIDE: 600; 310; 30; 20 INJECTION, SOLUTION INTRAVENOUS at 07:34

## 2024-04-05 ASSESSMENT — PAIN - FUNCTIONAL ASSESSMENT: PAIN_FUNCTIONAL_ASSESSMENT: 0-10

## 2024-04-05 NOTE — FLOWSHEET NOTE
04/05/24 0845   Handoff   Communication Given Periop Handoff/Relief   Handoff phase Phase I receiving   Handoff Given To Joyce PACU RN   Handoff Received From OR RN/ Benjamin SEWELL   Handoff Communication Face to Face;At bedside   Time Handoff Given 0845       0900: Notified Dr. Gutierrez of SBP in 170s, no order received       04/05/24 0920   Handoff   Communication Given Transfer Handoff   Handoff phase Phase I transferring   Handoff Given To [facility]   Handoff Received From [facility]   Handoff Communication Face to Face   Time Handoff Given 0920     0921: Sign out received from Dr. Gutierrez

## 2024-04-05 NOTE — BRIEF OP NOTE
Brief Postoperative Note      Patient: Boby Malone  YOB: 1950  MRN: [id number]    Date of Procedure: 4/5/2024    Pre-Op Diagnosis Codes:  Third degree hemorrhoids    Post-Op Diagnosis: Same       Procedure(s):  THREE COLUMN HEMORRHOIDECTOMY    Surgeon(s):  Kimberly rCockett MD    Assistant:  * No surgical staff found *    Anesthesia: General    Estimated Blood Loss (mL): less than 50     Complications: None    Specimens:   ID Type Source Tests Collected by Time Destination   1 : HEMORRHOIDS Tissue Rectum SURGICAL PATHOLOGY Kimberly Crockett MD 4/5/2024 0800        Implants:  * No implants in log *      Drains: * No LDAs found *    Findings:  Infection Present At Time Of Surgery (PATOS) (choose all levels that have infection present):  No infection present  Other Findings: grade 3 internal hemorrhoids in all three columns    Electronically signed by Kimberly Crockett MD on 4/5/2024 at 8:27 AM

## 2024-04-05 NOTE — ANESTHESIA POSTPROCEDURE EVALUATION
Department of Anesthesiology  Postprocedure Note    Patient: Boby Malone  MRN: [id number]  YOB: 1950  Date of evaluation: 4/5/2024    Procedure Summary       Date: 04/05/24 Room / Location: [facility] MAIN OR  / [facility] MAIN OR    Anesthesia Start: 0734 Anesthesia Stop: 0844    Procedure: ONE COLUMN HEMORRHOIDECTOMY (Rectum) Diagnosis:       First degree hemorrhoids      (First degree hemorrhoids [K64.0])    Providers: Kimberly Crockett MD Responsible Provider: Leodan Gutierrez MD    Anesthesia Type: General ASA Status: 2            Anesthesia Type: General    Kalli Phase I: Kalli Score: 10    Kalli Phase II:      Anesthesia Post Evaluation    Patient location during evaluation: PACU  Patient participation: complete - patient participated  Level of consciousness: sleepy but conscious  Airway patency: patent  Nausea & Vomiting: no nausea and no vomiting  Cardiovascular status: hemodynamically stable, hypertensive and blood pressure returned to baseline  Respiratory status: acceptable and room air  Hydration status: stable  Multimodal analgesia pain management approach    No notable events documented.

## 2024-04-05 NOTE — ANESTHESIA PRE PROCEDURE
Department of Anesthesiology  Preprocedure Note       Name:  Boby Malone   Age:  74 y.o.  :  1950                                          MRN:  [id number]         Date:  2024      Surgeon: Surgeon(s):  Kimberly Crockett MD    Procedure: Procedure(s):  ONE COLUMN HEMORRHOIDECTOMY    Medications prior to admission:   Prior to Admission medications    Medication Sig Start Date End Date Taking? Authorizing Provider   rivaroxaban (XARELTO) 20 MG TABS tablet Take 1 tablet by mouth 23   Provider, MD Jacquie   DULoxetine (CYMBALTA) 20 MG extended release capsule Take 1 capsule by mouth daily 24   Shankar Martinez MD   tiZANidine (ZANAFLEX) 4 MG tablet Take 1 tablet by mouth nightly as needed (muscle spasm) 24   Shankar Martinez MD   Multiple Vitamin (MULTIVITAMIN PO) Take by mouth    Automatic Reconciliation, Ar   Everolimus 5 MG TABS Take 1 tablet by mouth 2 times daily 10/31/22   Automatic Reconciliation, Ar   fluticasone (FLONASE) 50 MCG/ACT nasal spray as needed 6/3/22   Automatic Reconciliation, Ar   gabapentin (NEURONTIN) 300 MG capsule Take 1 capsule by mouth. 22   Automatic Reconciliation, Ar   ipratropium (ATROVENT HFA) 17 MCG/ACT inhaler 2 times daily as needed 6/3/22   Automatic Reconciliation, Ar   loratadine (CLARITIN) 10 MG tablet as needed 6/3/22   Automatic Reconciliation, Ar   octreotide (SANDOSTATIN) 50 MCG/ML SOLN Infuse 1 mL intravenously    Automatic Reconciliation, Ar       Current medications:    Current Facility-Administered Medications   Medication Dose Route Frequency Provider Last Rate Last Admin   • lactated ringers IV soln infusion   IntraVENous Continuous Leodan Gutierrez MD           Allergies:    Allergies   Allergen Reactions   • Molds & Smuts Anxiety, Cough and Shortness Of Breath       Problem List:  There is no problem list on file for this patient.      Past Medical History:        Diagnosis Date   • Cancer (HCC)        Past Surgical History:

## 2024-04-05 NOTE — DISCHARGE INSTRUCTIONS
Today you had hemorrhoidectomy.     Please do not restart Xarelto until Thursday, April 11th.     Pain, drainage, a small amount of bleeding, and swelling are normal as you heal.     Alternate tylenol 1000mg and ibuprofen 400mg for pain every 3 hours. For example, take tylenol 1000mg (2 extra strength tylenol or 3 regular strength tylenol) at 9am, then ibuprofen 400mg (2 tabs of ibuprofen) at 12pm, tylenol at 3pm, ibuprofen at 6pm, etc.  Take oxycodone every 6 hours as needed for pain not controlled by tylenol and ibuprofen alone. Soak in a hot tub of water up to three times a day if you are able to help with pain and healing.      Continue to take fiber every day and MiraLAX 1-2 times a day as needed to ensure soft, easy to pass bowel movements.      Keep a gauze over your anal area to collect drainage to protect the surrounding skin and your clothing.      Please schedule a follow up appointment with me in about 4-6 weeks.     Call for pain that worsens despite pain medication, fever, inability to urinate or have a bowel movement, or other symptoms that are concerning to you.      Kimberly Crockett MD   [facility]  [phone]  DISCHARGE SUMMARY from Nurse    PATIENT INSTRUCTIONS:    After general anesthesia or intravenous sedation, for 24 hours or while taking prescription narcotics:    Have someone responsible help you with your care  Limit your activities  Do not drive and operate hazardous machinery  Do not make important personal, legal or business decisions  Do not drink alcoholic beverages  If you have not urinated within 8 hours after discharge, please contact your surgeon on call  Resume your medications unless otherwise instructed    From general anesthesia, intravenous sedation, or while taking prescription narcotics, you may experience:    Drowsiness, dizziness, sleepiness, or confusion  Difficulty remembering or delayed reaction times  Difficulty with your balance, especially while  cut back on smoking    Smoking slows your healing.      CONTROL YOUR BLOOD SUGAR    High blood sugars slow wound healing.    If you are diabetic, control your blood sugar levels before and after your surgery.    Please take time to review all of your Home Care Instructions and Medication Information sheets provided in your discharge packet. If you have any questions, please contact your surgeon's office. Thank you.    The discharge information has been reviewed with the patient and instruction recipient.  The patient and instruction recipient verbalized understanding.  Discharge medications reviewed with the patient and instruction recipient and appropriate educational materials and side effects teaching were provided.      Please provide this summary of care documentation to your next provider.

## 2024-04-05 NOTE — PERIOP NOTE
Patient is awake alert, speaks some english, however fluent in Italian. Daughter with her mother translating. Patient able to state birthday, name.  Unable to connect with language line  after multiple attempts, Dr Crockett and Dr Gutierrez aware. Daughter at bedside assisting with translation

## 2024-04-05 NOTE — OP NOTE
Operative Note      Patient: Boby Malone  YOB: 1950  MRN: [id number]    Date of Procedure: 4/5/2024    Pre-Op Diagnosis Codes:     * Third degree hemorrhoids    Post-Op Diagnosis: Same       Procedure(s):  THREE COLUMN HEMORRHOIDECTOMY    Surgeon(s):  Kimberly Crockett MD    Assistant:   * No surgical staff found *    Anesthesia: General    Estimated Blood Loss (mL): less than 50     Complications: None    Specimens:   ID Type Source Tests Collected by Time Destination   1 : HEMORRHOIDS Tissue Rectum SURGICAL PATHOLOGY Kimberly Crockett MD 4/5/2024 0800        Implants:  * No implants in log *      Drains: * No LDAs found *    Findings:  Infection Present At Time Of Surgery (PATOS) (choose all levels that have infection present):  No infection present  Other Findings: third degree hemorrhoids in all three columns    Detailed Description of Procedure:   The patient was taken back to the operating room and positioned in the prone position under general anesthesia. The anal area was prepped and draped in the usual fashion and a timeout was performed. A total of 30cc of 0.25% marcine with epi was injected as perianal and pudendal blocks. Digital exam and anoscopy were then performed revealing grade 3 excoriated hemorrhoids in all three columns. I began with the right anterior column which was the largest. A 3-0 chromic suture was placed in a figure of eight fashion at the apex of the hemorrhoid and was saved for later use. An elliptical incision was then made encompassing the internal and external hemorrhoidal tissue. Hemorrhoidal tissue was then dissected off of underlying sphincter muscle with care taken not to injure underlying sphincter muscle. This dissection was carried proximally up to the previously placed suture and the hemorrhoid was amputated and sent for pathology. The previously placed suture was then run distally to just a few milimeters shy of the distal extent of the incision to

## 2024-07-30 NOTE — TELEPHONE ENCOUNTER
Pt daughter, Belkys sent a message on [employer] requesting a refill    gabapentin (NEURONTIN) 300 MG capsule     [facility] on [address] [place]

## 2024-07-31 RX ORDER — GABAPENTIN 300 MG/1
300 CAPSULE ORAL
Qty: 90 CAPSULE | Refills: 3 | Status: CANCELLED | OUTPATIENT
Start: 2024-07-31

## 2024-07-31 NOTE — TELEPHONE ENCOUNTER
Gabapentin has been prescribed by her spine doctor.  This is a controlled substance.  Need to stick with the prescribing doctor

## 2024-07-31 NOTE — TELEPHONE ENCOUNTER
Spoke with pt's daughter and she states pt doesn't see the Spine Dr anymore and has a appt with Dr Martinez in Sept but is in pain and it helps her sleep and would like to have a refill until her next appt.

## 2024-08-01 NOTE — TELEPHONE ENCOUNTER
What dose of gabapentin?  Orthopedist appointment 10/23 documented gabapentin 600 mg tablet take 2 tablets 3 times daily.  That is a pretty high dose.  I want to confirm that this is what she needs before I send it in

## 2024-08-05 ENCOUNTER — OFFICE VISIT (OUTPATIENT)
Age: 74
End: 2024-08-05
Payer: MEDICARE

## 2024-08-05 VITALS
HEART RATE: 76 BPM | TEMPERATURE: 98.2 F | SYSTOLIC BLOOD PRESSURE: 137 MMHG | WEIGHT: 122.2 LBS | BODY MASS INDEX: 24.64 KG/M2 | RESPIRATION RATE: 16 BRPM | OXYGEN SATURATION: 96 % | HEIGHT: 59 IN | DIASTOLIC BLOOD PRESSURE: 77 MMHG

## 2024-08-05 DIAGNOSIS — M54.41 CHRONIC RIGHT-SIDED LOW BACK PAIN WITH RIGHT-SIDED SCIATICA: Primary | ICD-10-CM

## 2024-08-05 DIAGNOSIS — G89.29 OTHER CHRONIC PAIN: ICD-10-CM

## 2024-08-05 DIAGNOSIS — G89.29 CHRONIC RIGHT-SIDED LOW BACK PAIN WITH RIGHT-SIDED SCIATICA: Primary | ICD-10-CM

## 2024-08-05 DIAGNOSIS — C34.90 MALIGNANT NEOPLASM OF UNSPECIFIED PART OF UNSPECIFIED BRONCHUS OR LUNG (HCC): ICD-10-CM

## 2024-08-05 DIAGNOSIS — M79.18 MYALGIA, OTHER SITE: ICD-10-CM

## 2024-08-05 PROCEDURE — 99214 OFFICE O/P EST MOD 30 MIN: CPT | Performed by: FAMILY MEDICINE

## 2024-08-05 PROCEDURE — G8420 CALC BMI NORM PARAMETERS: HCPCS | Performed by: FAMILY MEDICINE

## 2024-08-05 PROCEDURE — 1036F TOBACCO NON-USER: CPT | Performed by: FAMILY MEDICINE

## 2024-08-05 PROCEDURE — 1090F PRES/ABSN URINE INCON ASSESS: CPT | Performed by: FAMILY MEDICINE

## 2024-08-05 PROCEDURE — 1123F ACP DISCUSS/DSCN MKR DOCD: CPT | Performed by: FAMILY MEDICINE

## 2024-08-05 PROCEDURE — 3017F COLORECTAL CA SCREEN DOC REV: CPT | Performed by: FAMILY MEDICINE

## 2024-08-05 PROCEDURE — G8427 DOCREV CUR MEDS BY ELIG CLIN: HCPCS | Performed by: FAMILY MEDICINE

## 2024-08-05 PROCEDURE — G8399 PT W/DXA RESULTS DOCUMENT: HCPCS | Performed by: FAMILY MEDICINE

## 2024-08-05 RX ORDER — TIZANIDINE 4 MG/1
4 TABLET ORAL NIGHTLY PRN
Qty: 90 TABLET | Refills: 3 | Status: SHIPPED | OUTPATIENT
Start: 2024-08-05

## 2024-08-05 RX ORDER — GABAPENTIN 300 MG/1
300 CAPSULE ORAL 3 TIMES DAILY
Qty: 270 CAPSULE | Refills: 1 | Status: SHIPPED | OUTPATIENT
Start: 2024-08-05 | End: 2025-02-01

## 2024-08-05 RX ORDER — DULOXETIN HYDROCHLORIDE 60 MG/1
60 CAPSULE, DELAYED RELEASE ORAL DAILY
Qty: 90 CAPSULE | Refills: 3 | Status: SHIPPED | OUTPATIENT
Start: 2024-08-05

## 2024-08-05 NOTE — PROGRESS NOTES
Chief Complaint   Patient presents with    Back Pain    Leg Pain         Health Maintenance Due   Topic Date Due    COVID-19 Vaccine (1) Never done    Hepatitis C screen  Never done    DTaP/Tdap/Td vaccine (1 - Tdap) Never done    Lipids  Never done    Colorectal Cancer Screen  Never done    Respiratory Syncytial Virus (RSV) Pregnant or age 60 yrs+ (1 - 1-dose 60+ series) Never done    Shingles vaccine (2 of 3) 08/18/2016    Pneumococcal 65+ years Vaccine (3 of 3 - PPSV23 or PCV20) 06/26/2022    Flu vaccine (1) 08/01/2024         \"Have you been to the ER, urgent care clinic since your last visit?  Hospitalized since your last visit?\"    YES - When: approximately 2 months ago.  Where and Why: ED.    “Have you seen or consulted any other health care providers outside of [facility] since your last visit?”    YES - When: approximately Yes ago.  Where and Why[facility]    “Have you had a colorectal cancer screening such as a colonoscopy/FIT/Cologuard?    YES - Type: 4 years ago.  MD.  [facility].  Daughter authorized record release     No colonoscopy on file  No cologuard on file  No FIT/FOBT on file   No flexible sigmoidoscopy on file            
management [facility] [place].  She may be a good candidate for nerve stimulator      ICD-10-CM    1. Chronic right-sided low back pain with right-sided sciatica  M54.41 tiZANidine (ZANAFLEX) 4 MG tablet    G89.29 gabapentin (NEURONTIN) 300 MG capsule     Amb External Referral To Pain Medicine      2. Other chronic pain  G89.29 DULoxetine (CYMBALTA) 60 MG extended release capsule     Amb External Referral To Pain Medicine      3. Myalgia, other site  M79.18 tiZANidine (ZANAFLEX) 4 MG tablet     Amb External Referral To Pain Medicine      4. Malignant neoplasm of unspecified part of unspecified bronchus or lung (HCC)  C34.90             AVS given. Pt expressed understanding of instructions

## 2024-08-05 NOTE — PATIENT INSTRUCTIONS
[facility] [place]  Phone: [phone]     Cymbalta  Take 40 mg daily for 1 week then 60 mg daily

## 2024-09-12 DIAGNOSIS — N64.4 BREAST PAIN, LEFT: Primary | ICD-10-CM

## 2024-09-19 ENCOUNTER — HOSPITAL ENCOUNTER (OUTPATIENT)
Facility: HOSPITAL | Age: 74
Discharge: HOME | End: 2024-09-22
Payer: MEDICARE

## 2024-09-19 ENCOUNTER — TELEPHONE (OUTPATIENT)
Age: 74
End: 2024-09-19

## 2024-09-19 DIAGNOSIS — N64.4 BREAST PAIN, LEFT: Primary | ICD-10-CM

## 2024-09-19 DIAGNOSIS — N64.4 BREAST PAIN, LEFT: ICD-10-CM

## 2024-09-19 PROCEDURE — G0279 TOMOSYNTHESIS, MAMMO: HCPCS

## 2024-12-05 ENCOUNTER — HOSPITAL ENCOUNTER (OUTPATIENT)
Facility: HOSPITAL | Age: 74
Discharge: HOME | End: 2024-12-08
Payer: MEDICARE

## 2024-12-05 VITALS — BODY MASS INDEX: 24.6 KG/M2 | WEIGHT: 122 LBS | HEIGHT: 59 IN

## 2024-12-05 DIAGNOSIS — Z12.31 VISIT FOR SCREENING MAMMOGRAM: ICD-10-CM

## 2024-12-05 PROCEDURE — 77063 BREAST TOMOSYNTHESIS BI: CPT

## (undated) DEVICE — MAJOR LAPAROTOMY-MRMC: Brand: MEDLINE INDUSTRIES, INC.

## (undated) DEVICE — SPONGE GZ W4XL4IN COT 12 PLY TYP VII WVN C FLD DSGN STERILE

## (undated) DEVICE — SUTURE CHROMIC GUT SZ 3-0 L27IN ABSRB BRN L26MM SH 1/2 CIR G122H

## (undated) DEVICE — YANKAUER,POOLE TIP,STERILE,50/CS: Brand: MEDLINE

## (undated) DEVICE — SYRINGE MED 10ML LUERLOCK TIP W/O SFTY DISP

## (undated) DEVICE — GLOVE SURG SZ 65 THK91MIL LTX FREE SYN POLYISOPRENE

## (undated) DEVICE — Z DISC USE 2220190 SUTURE VCRL SZ 3-0 L27IN ABSRB UD L26MM SH 1/2 CIR J416H

## (undated) DEVICE — GLOVE SURG SZ 65 L12IN FNGR THK79MIL GRN LTX FREE

## (undated) DEVICE — JELLY,LUBE,STERILE,FLIP TOP,TUBE,4-OZ: Brand: MEDLINE

## (undated) DEVICE — SUTURE PERMAHAND SZ 2-0 L30IN NONABSORBABLE BLK SILK W/O A305H

## (undated) DEVICE — POSITIONER HD REST FOAM CMFRT TCH

## (undated) DEVICE — DISSECTOR ENDOSCP L21CM TIP CURVATURE 40DEG FN CRV JAW VES

## (undated) DEVICE — SOLUTION SCRB 2OZ 10% POVIDONE IOD ANTIMIC BTL

## (undated) DEVICE — NEEDLE HYPODERM SAFETY  22GX1.5IN